# Patient Record
(demographics unavailable — no encounter records)

---

## 2024-10-14 NOTE — PHYSICAL EXAM
[No Acute Distress] : no acute distress [Well Nourished] : well nourished [No Accessory Muscle Use] : no accessory muscle use [Regular Rhythm] : with a regular rhythm [Normal S1, S2] : normal S1 and S2 [No Murmur] : no murmur heard [No Edema] : there was no peripheral edema [Soft] : abdomen soft [Non Tender] : non-tender [Non-distended] : non-distended [No Masses] : no abdominal mass palpated [No HSM] : no HSM [Normal Bowel Sounds] : normal bowel sounds [Normal Affect] : the affect was normal [Normal Insight/Judgement] : insight and judgment were intact

## 2024-10-21 NOTE — HISTORY OF PRESENT ILLNESS
[(Patient denies any chest pain, claudication, dyspnea on exertion, orthopnea, palpitations or syncope)] : Patient denies any chest pain, claudication, dyspnea on exertion, orthopnea, palpitations or syncope [Excellent (>10 METs)] : Excellent (>10 METs) [Self] : no previous adverse anesthesia reaction [FreeTextEntry1] : robotic hysterectomy, salpingectomy, cystoscopy, ANN-MARIE   [FreeTextEntry2] : 10/24/24 [FreeTextEntry3] : Dr. Cole  [FreeTextEntry4] :  P: [phone]  PST: [phone]  going for PST tomorrow 10/15/24

## 2024-10-21 NOTE — RESULTS/DATA
[] : results reviewed [de-identified] : 10/15/24 EBONY wnl  [de-identified] : 10/15/24 william pop [de-identified] : 8/29/24 ekg nsr ; 10/15/24 EKG NSR [de-identified] : hcg negative UA negative

## 2024-10-21 NOTE — HISTORY OF PRESENT ILLNESS
[(Patient denies any chest pain, claudication, dyspnea on exertion, orthopnea, palpitations or syncope)] : Patient denies any chest pain, claudication, dyspnea on exertion, orthopnea, palpitations or syncope [Excellent (>10 METs)] : Excellent (>10 METs) [Self] : no previous adverse anesthesia reaction [FreeTextEntry1] : robotic hysterectomy, salpingectomy, cystoscopy, ANN-MARIE   [FreeTextEntry2] : 10/24/24 [FreeTextEntry3] : Dr. Cole  [FreeTextEntry4] :  P: [phone]  PST: [phone]  going for PST tomorrow 10/15/24

## 2024-10-21 NOTE — ASSESSMENT
[Patient Optimized for Surgery] : Patient optimized for surgery [FreeTextEntry7] : may take am meds with sip of water. Avoid NSAIDs & MVI 1 week prior to surgery. use Advair inhaler day of surgery

## 2024-10-21 NOTE — RESULTS/DATA
[] : results reviewed [de-identified] : 10/15/24 EBONY wnl  [de-identified] : 10/15/24 william pop [de-identified] : 8/29/24 ekg nsr ; 10/15/24 EKG NSR [de-identified] : hcg negative UA negative
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
## 2024-11-27 NOTE — PHYSICAL EXAM
[Alert] : alert [Well Nourished] : well nourished [No Acute Distress] : no acute distress [Well Developed] : well developed [Normal Sclera/Conjunctiva] : normal sclera/conjunctiva [No Proptosis] : no proptosis [Normal Oropharynx] : the oropharynx was normal [Thyroid Not Enlarged] : the thyroid was not enlarged [No Thyroid Nodules] : no palpable thyroid nodules [No Accessory Muscle Use] : no accessory muscle use [Clear to Auscultation] : lungs were clear to auscultation bilaterally [Normal S1, S2] : normal S1 and S2 [Normal Rate] : heart rate was normal [Regular Rhythm] : with a regular rhythm [No Edema] : no peripheral edema [Normal Anterior Cervical Nodes] : no anterior cervical lymphadenopathy [No Spinal Tenderness] : no spinal tenderness [Spine Straight] : spine straight [No Stigmata of Cushings Syndrome] : no stigmata of Cushings Syndrome [Normal Gait] : normal gait [Normal Strength/Tone] : muscle strength and tone were normal [No Rash] : no rash [Normal Reflexes] : deep tendon reflexes were 2+ and symmetric [No Tremors] : no tremors [Oriented x3] : oriented to person, place, and time [de-identified] : Abdominal exam deferred

## 2024-11-27 NOTE — PROCEDURE
[FreeTextEntry1] :  Bone density done (2/21/24) Indication: Comparison to 2022. Interval fracture Spine:  -3.0 Osteoporosis prior reports -2.8 Total Hip: -2.7 Osteoporosis no prior.  Femur Neck: -3.2 Osteoporosis -2.9 Proximal Radius: -1.5 Osteopenia no prior    Bone density done (11/2022) Indication:  Spine:   -2.8 Total Hip: Femur Neck: -2.9 Proximal Radius:

## 2024-11-27 NOTE — END OF VISIT
[FreeTextEntry3] :  This note was written by Rasheeda Goss on (August 08, 2024) acting as a medical scribe for Dr. Sparrow This note was authored by the medical scribe for me. I have reviewed, edited, and revised the note as needed. I am in agreement with the exam findings, imaging findings, and treatment plan.  oJse Sparrow MD
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
## 2024-12-04 NOTE — ASSESSMENT
[FreeTextEntry1] : 53 y/o F with Chronic Migraines as well as acute concussion. On exam with right cervical paraspinal and trap tenderness with spasm as well as + pain over left greater occipital notch with reproducible pain radiating forwards to temple and behind eye  Off Trokendi which she liked but with occasional word finding difficulties. Would like to consider restarting.  Failed multiple ppx meds- Guy Elliott.  Currently on Qulipta and discussed possible trial of Nurtec  AE with triptans- severe grogginess  Trial of Ubrelvy prn  -Consider ONB  -Modalities, therapeutic massage, stretching -Will start Meloxicam 7.5 mg 2x/day with meals x 7-10 days -fu in 3 months or sooner if needed.

## 2024-12-04 NOTE — PHYSICAL EXAM
[FreeTextEntry1] : General appearance: Well nourished and with attention to grooming. Neck/spine: Normal carotid pulses, without bruits. Normal range of motion in the neck. CV: RRR. Skin: No rash. Musculoskeletal: Right cervical paraspinal and trap tenderness with spasm, + pain over left greater occipital notch with reproducible pain radiating forwards to temple and behind eye No joint deformities, no scoliosis, lordosis, kyphosis, pes cavus or hammer toes. Extremities: No peripheral edema. Neurological exam: Mental status: Patient is alert, attentive and oriented X3. Speech is coherent and fluent without dysarthria or aphasia. Memory, naming, repetition, comprehension and ability to follow commands were intact. Named 3 past presidents correctly. Difficulty with serial 7's , 5/5 Immediate recall and 3/5 delayed recall (Sugar, baby, candle, tree, honesty). Affect normal.  CN: Pupils were 5mm and reactive to light. Extraocular movements were full. Visual fields are intact to confrontation. No nystagmus. There was no face, jaw, palate or tongue weakness or atrophy. Facial sensation was normal and symmetric. Hearing was grossly intact. Shoulder shrug was normal. Motor exam revealed normal bulk and tone. There is no evidence of atrophy or fasciculations. There is no pronator drift.  Manual muscle testing revealed 5/5 strength throughout including neck flexion/extension and proximal and distal muscles of the arms and legs. Sensory exam demonstrated was normal to touch, pin, proprioception, and vibration in arms and legs. Romberg was negative. DTRs: 2+ b/l biceps, 2+ triceps, 2 + brachioradialis, 2+ patellar, and 2+ ankle reflexes. Plantar responses were flexor bilaterally. No clonus, Peraza's or crossed adductor response. Coord: Normal finger to nose testing and rapid alternating movements. Gait: Normal gait

## 2024-12-11 NOTE — PHYSICAL EXAM
[Nasal Endoscopy Performed] : nasal endoscopy was performed, see procedure section for findings [Midline] : trachea located in midline position [Normal] : no rashes [de-identified] : small fissure at the left inferior, anterior nare

## 2024-12-11 NOTE — HISTORY OF PRESENT ILLNESS
[de-identified] : Patient comes in with recent bleeds from the right nostril and the left nostril.  She feels there is a cut in the left nostril as well. She has been trying to avoid blowing her nose to not start another bleed but at the same time is very congested.  She did have ear popping in the ears today related to her nasal congestion.

## 2024-12-11 NOTE — ASSESSMENT
[FreeTextEntry1] : Patient had a bad upper nosebleed over the weekend also has which she describes as a clot in the left nares on examination she has a lot of thick scab on right nasal cavity which was suctioned out alleviated all of her symptoms there the fissure in her left nares was identified put her on some mupirocin ointment reassured her that this should heal and she will follow-up and see us as needed.

## 2025-02-21 NOTE — END OF VISIT
[FreeTextEntry3] : I, Dr. Moreno personally performed the evaluation and management (E/M) services , including all procedures, for this established patient who presents today with (a) new problem(s)/exacerbation of (an) existing condition(s). That E/M includes conducting the clinically appropriate interval history &/or exam, assessing all new/exacerbated conditions, and establishing a new plan of care. Today, my BRY, Maria Eugenia Muñoz, was here to observe &/or participate in the visit & follow plan of care established by me.

## 2025-02-21 NOTE — HISTORY OF PRESENT ILLNESS
[de-identified] : Patient comes in with recent bleeds from the right nostril and the left nostril.  She feels there is a cut in the left nostril as well. She has been trying to avoid blowing her nose to not start another bleed but at the same time is very congested.  She did have ear popping in the ears today related to her nasal congestion.  [FreeTextEntry1] : Since the last visit patient has continued to get recurrent episodes of runny nose, and issues with nasal swelling that causes postnasal drip. She incidentally had an MRI of the brain and it found some sinus inflammation. She feels that the lining of her nose is inflamed and it makes it hard to get the mucus out

## 2025-02-21 NOTE — PHYSICAL EXAM
[Nasal Endoscopy Performed] : nasal endoscopy was performed, see procedure section for findings [Midline] : trachea located in midline position [Normal] : no rashes [de-identified] : small fissure at the left inferior, anterior nare

## 2025-02-21 NOTE — ASSESSMENT
[FreeTextEntry1] : Patient follows up was doing well until recently has been getting a lot of nasal congestion and rhinitis endoscopically she actually looks great she has no crusting whatsoever I put her on ibuprofen bromide to use as needed for her rhinitis she had an MRI which was performed and reviewed that showed that her sinuses are essentially clear endoscopically there is no accumulation of any debris that she usually gets looks great follow-up as needed
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
## 2025-03-10 NOTE — ASSESSMENT
[FreeTextEntry1] : 51 y/o F with Chronic Migraines, multiple falls, perceived cognitive difficulties. Concerned about chronic memory deficits in the context of social etoh and cannabis use.  Responding well to Qulipta, some concern of generalzed itchiness since starting requiring xyzal.   Off Trokendi which she liked but with occasional word finding difficulties.  Failed multiple ppx meds- Aimovig, Ajovy.  Will dc Qulipta x2 weeks to see if itchiness improves. Will consider restarting v restarting Trokendi in a few weeks depending on above.  Continue Ubrelvy prn  AE with triptans- severe grogginess   -Modalities, therapeutic massage, stretching -Will start Meloxicam 7.5 mg 2x/day with meals x 7-10 days -fu in 3 months or sooner if needed.

## 2025-03-10 NOTE — DATA REVIEWED
[FreeTextEntry1] : EXAM: [id number] - MR BRAIN - ORDERED BY: JOSH PATEL  PROCEDURE DATE: 02/19/2025  INTERPRETATION: MR BRAIN WITHOUT CONTRAST  TECHNIQUE: Multiplanar multisequence imaging of the brain without contrast was performed.  CLINICAL INDICATION: Ordering Dxs: R26.89 Other abnormalities of gait and mobility / G43.709 Chronic migraine without aura, not intractable, without status migrainosus.  COMPARISON: None. ____________________  FINDINGS:  BRAIN/EXTRA-AXIAL SPACES: Signal intensities within normal limits for age. Normal brain volume and morphology. No diffusion restriction to suggest acute infarction. No abnormal SWI signal to suggest hemosiderin deposition. No acute intracranial hemorrhage. No pathologic extra-axial fluid collection. No intracranial herniation. No hydrocephalus. Midline structures normal. The pituitary is unremarkable.  VASCULATURE: Central arterial flow voids preserved.  BONES: Calvarium and skull base unremarkable.  SOFT TISSUES: Extracranial soft tissues unremarkable.  OTHER: Orbits unremarkable. Mild mucosal thickening in the right maxillary sinus. Paranasal sinuses are otherwise clear. Mastoid air cells and middle ears clear. ____________________  IMPRESSION:  Unremarkable MRI of the brain.

## 2025-03-25 NOTE — ASSESSMENT
[FreeTextEntry1] : Patient follows up actually doing well she recently had COVID doing well suctioned out we will start the [employer] sinus rinses once again and follow-up in 6 months

## 2025-03-25 NOTE — HISTORY OF PRESENT ILLNESS
[de-identified] : Patient had COVID since the last visit but has recovered. She still has a lot of thick mucus residual from being sick. She is not having any facial pain or pressure. Continues to have nasal congestion. She is using the ipratropium with great benefit. She has been doing nasal saline as well.

## 2025-05-06 NOTE — PHYSICAL EXAM
[Nasal Endoscopy Performed] : nasal endoscopy was performed, see procedure section for findings [Normal] : external appearance is normal [de-identified] : crusting with some dried blood right IT  [de-identified] : crusting mostly right compared to left

## 2025-05-06 NOTE — ASSESSMENT
[FreeTextEntry1] : Rhinitis / Epistaxis R > L: scope shows bloody dry crusting in the middle of R inferior turbinate with some anterior septal dryness.  I suspect her bleeding was coming from right middle turbinate.  However this is too far back to cauterize in the office.  I would recommend increasing moisturization with mupirocin - will start daily rinses with added Mupirocin - rx Mupirocin once daily in [facility] sinus rinse - information given - can apply Mupirocin daily to b/l nostrils twice daily as well - will restart sprays once this is under control

## 2025-05-06 NOTE — PROCEDURE
[Anterior rhinoscopy insufficient to account for symptoms] : anterior rhinoscopy insufficient to account for symptoms [de-identified] : Malou Angeles MD [de-identified] :   Fiberoptic nasal endoscopy was performed.  R/b/a of procedure was explained to the patient and they agreed to proceed with procedure.    Significant factors noted: R inferior turbinate with some bloody crusting, suctioned off  / minimal anterior septal dryness, turbinates moist L side  Otherwise normal mucosa, normal b/l inferior, middle and superior turbinates, inferior, middle and superior meati and sphenoethmoidal recess.  No nasal polyps.  Septum midline       scope #: 5

## 2025-05-06 NOTE — HISTORY OF PRESENT ILLNESS
[de-identified] : Ms. 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 Alvarez  pt here for eval of constant runny nose, sniffling. she was in [place] last week and has been having R sided epistaxis more frequently, she has had some bleeding daily for last 2 weeks R > L. last night she had bleeding R side for 15 minutes. no bleeding today she was using Ipratopium nasal spray daily until a few weeks ago she uses [facility] med sinus rinse daily she does have seasonal allergies and this season has been particularly bad for her feels her nose runs all the time

## 2025-05-06 NOTE — END OF VISIT
[FreeTextEntry3] : I personally saw and examined EZEQUIEL VALLECILLO in detail. I spoke to ROLANDO Jc regarding the assessment and plan of care. I performed the procedures and relevant physical exam. I have made changes to the body of the note wherever necessary and appropriate.
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
## 2025-06-18 NOTE — PROCEDURE
[FreeTextEntry1] : Bone Mineral Density: 06/17/2025 Indication: vs 2024 to assess response to medication Spine: -1.8 osteopenia (+18.9%) Total hip: -2.2 osteopenia (+9.1%) Femoral neck:  -2.7 osteoporosis (+10.9%) Proximal radius: -1.5 osteopenia, no significant change  Bone density done (2/21/24) Indication: Comparison to 2022. Interval fracture Spine:  -3.0 Osteoporosis prior reports -2.8 Total Hip: -2.7 Osteoporosis no prior.  Femur Neck: -3.2 Osteoporosis -2.9 Proximal Radius: -1.5 Osteopenia no prior    Bone density done (11/2022) Indication:  Spine:   -2.8 Total Hip: Femur Neck: -2.9 Proximal Radius:
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
## 2025-06-18 NOTE — PHYSICAL EXAM
[No Murmurs] : no murmurs [Normal Bowel Sounds] : normal bowel sounds [Not Tender] : non-tender [Soft] : abdomen soft

## 2025-06-18 NOTE — END OF VISIT
[FreeTextEntry3] : This note was written by Che Toledo on (June 17, 2025) acting as a medical scribe for Dr. Sparrow. This note was authored by the medical scribe for me. I have reviewed, edited, and revised the note as needed. I am in agreement with the exam findings, imaging findings, and treatment plan. Jose Sparrow MD
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
